# Patient Record
Sex: FEMALE | Race: WHITE | NOT HISPANIC OR LATINO | Employment: PART TIME | URBAN - METROPOLITAN AREA
[De-identification: names, ages, dates, MRNs, and addresses within clinical notes are randomized per-mention and may not be internally consistent; named-entity substitution may affect disease eponyms.]

---

## 2023-08-08 ENCOUNTER — OFFICE VISIT (OUTPATIENT)
Dept: PRIMARY CARE | Facility: CLINIC | Age: 33
End: 2023-08-08
Payer: COMMERCIAL

## 2023-08-08 VITALS
DIASTOLIC BLOOD PRESSURE: 65 MMHG | WEIGHT: 145.4 LBS | BODY MASS INDEX: 23.47 KG/M2 | SYSTOLIC BLOOD PRESSURE: 108 MMHG | HEART RATE: 72 BPM | RESPIRATION RATE: 14 BRPM

## 2023-08-08 DIAGNOSIS — F90.9 ATTENTION DEFICIT HYPERACTIVITY DISORDER (ADHD), UNSPECIFIED ADHD TYPE: Primary | ICD-10-CM

## 2023-08-08 DIAGNOSIS — Z00.00 ROUTINE GENERAL MEDICAL EXAMINATION AT A HEALTH CARE FACILITY: ICD-10-CM

## 2023-08-08 PROCEDURE — 80365 DRUG SCREENING OXYCODONE: CPT

## 2023-08-08 PROCEDURE — 80361 OPIATES 1 OR MORE: CPT

## 2023-08-08 PROCEDURE — 80368 SEDATIVE HYPNOTICS: CPT

## 2023-08-08 PROCEDURE — 80346 BENZODIAZEPINES1-12: CPT

## 2023-08-08 PROCEDURE — 80358 DRUG SCREENING METHADONE: CPT

## 2023-08-08 PROCEDURE — 80373 DRUG SCREENING TRAMADOL: CPT

## 2023-08-08 PROCEDURE — 99395 PREV VISIT EST AGE 18-39: CPT | Performed by: INTERNAL MEDICINE

## 2023-08-08 PROCEDURE — 80307 DRUG TEST PRSMV CHEM ANLYZR: CPT

## 2023-08-08 PROCEDURE — 80354 DRUG SCREENING FENTANYL: CPT

## 2023-08-08 RX ORDER — FOLIC ACID/MULTIVIT,IRON,MINER 0.4MG-18MG
1 TABLET ORAL DAILY
COMMUNITY
Start: 2022-08-04

## 2023-08-08 RX ORDER — DEXTROAMPHETAMINE SACCHARATE, AMPHETAMINE ASPARTATE, DEXTROAMPHETAMINE SULFATE AND AMPHETAMINE SULFATE 2.5; 2.5; 2.5; 2.5 MG/1; MG/1; MG/1; MG/1
10 TABLET ORAL 2 TIMES DAILY
Qty: 60 TABLET | Refills: 0 | Status: SHIPPED | OUTPATIENT
Start: 2023-08-08 | End: 2023-11-08 | Stop reason: SDUPTHER

## 2023-08-08 NOTE — PROGRESS NOTES
"Subjective   33yo F with PMH ADHD presenting for annual physical exam.     Pt recently gave birth 3/2023 at 39 weeks complicated by perineal laceration. Delivered at [facility], had a baby boy. Initially felt some \"baby blues\" but has since felt much better. She has had some constipation since pregnancy she has been managing with OTC miralax or colace. She is trying to increase fiber/fluids in diet to help with symptoms. She is weaning off breastfeeding right now and has had some dizziness during the process. Denies any falls. Otherwise, feels well and denies any other health concerns.    Denies any chest pain, shortness of breath, dysuria, hematuria, leg swelling, numbness/tingling, or changes in vision.     Exercise: walking, sometimes peloton    Tobacco: never  Alcohol: 3 drinks a week (wine)   Drugs: no illicit drugs  Live in [place] with  and baby. Not working right now, thinking of going back part time in September- 2 times a week.     12pt ROS was completed and is negative other than above HPI.     Current Outpatient Medications   Medication Instructions    PNV cmb#95-ferrous fumarate-FA (Prenatal) 28 mg iron- 800 mcg tablet 1 tablet, oral, Daily        Objective   Visit Vitals  /65   Pulse 72   Resp 14   Wt 66 kg (145 lb 6.4 oz)   BMI 23.47 kg/m²   BSA 1.75 m²        Physical Exam   General: in no acute distress, appears stated age  HEENT: PERRL, no slceral icterus, MMM  Lungs: CTAB, no increased WOB, no wheezes noted  Heart: RRR, no murmurs noted  Abdomen: soft, nontender, nondistended  Skin: no suspect lesions/rashes noted  Ext: no cyanosis or edema, 2+ PT/DP pulses   Neuro: no FND, intact sensation bilaterally, moves all 4 ext spontaneously   Psych: appropriate mood/affect     Assessment/Plan   33yo F with PMH ADHD presenting for annual physical exam.     Constipation postpartum  -encouraged fluids/fiber in diet, exercise to help symptoms   -encouraged continued miralax prn for constipation "     Anal sphincter incontinence, altered sexual function postpartum   -follows with urogyn, obgyn  -s/p PT (pelvic floor training) for perineal laceration and anal sphincter incontinence    ADHD  -has not been taking adderall since pregnancy, still not taking  -would like prn script when she goes back to work in September   -continue to monitor symptoms    HM  -PAP with HPV testing 3/2022 normal   -Tdap 2023- had one during pregnancy   -labs 3/2023    Xiao Ambrocio MD   PGY2, Internal Medicine Resident

## 2023-08-08 NOTE — PROGRESS NOTES
I reviewed with the resident the medical history and the resident’s findings on physical examination.  I discussed with the resident the patient’s diagnosis and concur with the treatment plan as documented in the resident note.         Annika Marsh is a 72 yo F presenting for sick visit.   CPE/MCR due 11.21.23.     1. Knee OA s/p L TKR, R knee OA with injection therapy     1a. L hip OA pending visit with ortho to discuss hip replacement      2. Dysuria      3. Hypothyroidism   -ltx 88      4. OAB   -failed myrbetriq   -failed solifenacin   -failed oxybytnin   -discussed third line tr ten PTNS, Botox, SNM at last visit 6/2021   -rec'ed UDS   [ ]not in re-refrral at this time   [ ]desires another trial of a med - trospium will be trialed but not optimistic   [ ]consider re-referral urogyen      5. DLD   -livalo   -atorva 20      6. GERD   -omeprazole      7. Tobacco abuse  -declines CT lung screening     #HM   -shingrix x2   -flu utd   -tdap 2021   -pneumovax utd   -cscope 4/2019 - TA   -screen labs 11.22  -mammo 6/2021 - due   -dexa 3/2022 - osteopenia   -declines ct lung screening, continue to re-address        Rosa Voss MD

## 2023-08-10 LAB
6-ACETYLMORPHINE: <25 NG/ML
7-AMINOCLONAZEPAM: <25 NG/ML
ALPHA-HYDROXYALPRAZOLAM: <25 NG/ML
ALPHA-HYDROXYMIDAZOLAM: <25 NG/ML
ALPRAZOLAM: <25 NG/ML
AMPHETAMINE (PRESENCE) IN URINE BY SCREEN METHOD: ABNORMAL
BARBITURATES PRESENCE IN URINE BY SCREEN METHOD: ABNORMAL
CANNABINOIDS IN URINE BY SCREEN METHOD: ABNORMAL
CHLORDIAZEPOXIDE: <25 NG/ML
CLONAZEPAM: <25 NG/ML
COCAINE (PRESENCE) IN URINE BY SCREEN METHOD: ABNORMAL
CODEINE: <50 NG/ML
CREATINE, URINE FOR DRUG: 12.3 MG/DL
DIAZEPAM: <25 NG/ML
DRUG SCREEN COMMENT URINE: ABNORMAL
EDDP: <25 NG/ML
FENTANYL CONFIRMATION, URINE: <2.5 NG/ML
HYDROCODONE: <25 NG/ML
HYDROMORPHONE: <25 NG/ML
LORAZEPAM: <25 NG/ML
METHADONE CONFIRMATION,URINE: <25 NG/ML
MIDAZOLAM: <25 NG/ML
MORPHINE URINE: <50 NG/ML
NORDIAZEPAM: <25 NG/ML
NORFENTANYL: <2.5 NG/ML
NORHYDROCODONE: <25 NG/ML
NOROXYCODONE: <25 NG/ML
O-DESMETHYLTRAMADOL: <50 NG/ML
OXAZEPAM: <25 NG/ML
OXYCODONE: <25 NG/ML
OXYMORPHONE: <25 NG/ML
PHENCYCLIDINE (PRESENCE) IN URINE BY SCREEN METHOD: ABNORMAL
SPECIFIC GRAVITY, URINE DRUG: 1.01
TEMAZEPAM: <25 NG/ML
TRAMADOL: <50 NG/ML
ZOLPIDEM METABOLITE (ZCA): <25 NG/ML
ZOLPIDEM: <25 NG/ML

## 2023-11-08 DIAGNOSIS — F90.9 ATTENTION DEFICIT HYPERACTIVITY DISORDER (ADHD), UNSPECIFIED ADHD TYPE: ICD-10-CM

## 2023-11-09 RX ORDER — DEXTROAMPHETAMINE SACCHARATE, AMPHETAMINE ASPARTATE, DEXTROAMPHETAMINE SULFATE AND AMPHETAMINE SULFATE 2.5; 2.5; 2.5; 2.5 MG/1; MG/1; MG/1; MG/1
10 TABLET ORAL 2 TIMES DAILY
Qty: 60 TABLET | Refills: 0 | Status: SHIPPED | OUTPATIENT
Start: 2023-11-09 | End: 2023-12-27 | Stop reason: SDUPTHER

## 2023-11-19 DIAGNOSIS — J32.9 BACTERIAL SINUSITIS: Primary | ICD-10-CM

## 2023-11-19 DIAGNOSIS — B96.89 BACTERIAL SINUSITIS: Primary | ICD-10-CM

## 2023-11-19 RX ORDER — AMOXICILLIN AND CLAVULANATE POTASSIUM 875; 125 MG/1; MG/1
875 TABLET, FILM COATED ORAL 2 TIMES DAILY
Qty: 14 TABLET | Refills: 0 | Status: SHIPPED | OUTPATIENT
Start: 2023-11-19 | End: 2023-11-26

## 2023-11-19 NOTE — PROGRESS NOTES
Viral URI with concern for progression to acute bacterial sinusitis   Discussed with patient over the phone  Planning to monitor symptoms; if persistent, consider starting antibiotics later this week  Provided presently given upcoming travel out of town    Juarez Altamirano MD

## 2023-12-27 DIAGNOSIS — F90.9 ATTENTION DEFICIT HYPERACTIVITY DISORDER (ADHD), UNSPECIFIED ADHD TYPE: ICD-10-CM

## 2023-12-27 NOTE — TELEPHONE ENCOUNTER
Patient say she is in [place] and her adderall will run out, she is requesting a refill sent to [facility] in [facility] listed in chart

## 2023-12-28 RX ORDER — DEXTROAMPHETAMINE SACCHARATE, AMPHETAMINE ASPARTATE, DEXTROAMPHETAMINE SULFATE AND AMPHETAMINE SULFATE 2.5; 2.5; 2.5; 2.5 MG/1; MG/1; MG/1; MG/1
10 TABLET ORAL 2 TIMES DAILY
Qty: 60 TABLET | Refills: 0 | Status: SHIPPED | OUTPATIENT
Start: 2023-12-28 | End: 2024-02-28 | Stop reason: SDUPTHER

## 2024-02-27 ENCOUNTER — TELEPHONE (OUTPATIENT)
Dept: PRIMARY CARE | Facility: CLINIC | Age: 34
End: 2024-02-27
Payer: COMMERCIAL

## 2024-02-27 DIAGNOSIS — F90.9 ATTENTION DEFICIT HYPERACTIVITY DISORDER (ADHD), UNSPECIFIED ADHD TYPE: ICD-10-CM

## 2024-02-28 RX ORDER — DEXTROAMPHETAMINE SACCHARATE, AMPHETAMINE ASPARTATE, DEXTROAMPHETAMINE SULFATE AND AMPHETAMINE SULFATE 2.5; 2.5; 2.5; 2.5 MG/1; MG/1; MG/1; MG/1
10 TABLET ORAL 2 TIMES DAILY
Qty: 40 TABLET | Refills: 0 | Status: SHIPPED | OUTPATIENT
Start: 2024-02-28 | End: 2024-03-26 | Stop reason: SDUPTHER

## 2024-03-25 ENCOUNTER — APPOINTMENT (OUTPATIENT)
Dept: PRIMARY CARE | Facility: CLINIC | Age: 34
End: 2024-03-25
Payer: COMMERCIAL

## 2024-03-26 ENCOUNTER — APPOINTMENT (OUTPATIENT)
Dept: PRIMARY CARE | Facility: CLINIC | Age: 34
End: 2024-03-26
Payer: COMMERCIAL

## 2024-03-26 ENCOUNTER — OFFICE VISIT (OUTPATIENT)
Dept: PRIMARY CARE | Facility: CLINIC | Age: 34
End: 2024-03-26
Payer: COMMERCIAL

## 2024-03-26 VITALS — DIASTOLIC BLOOD PRESSURE: 72 MMHG | SYSTOLIC BLOOD PRESSURE: 108 MMHG

## 2024-03-26 DIAGNOSIS — F90.9 ATTENTION DEFICIT HYPERACTIVITY DISORDER (ADHD), UNSPECIFIED ADHD TYPE: ICD-10-CM

## 2024-03-26 PROCEDURE — 99213 OFFICE O/P EST LOW 20 MIN: CPT | Performed by: INTERNAL MEDICINE

## 2024-03-26 RX ORDER — DEXTROAMPHETAMINE SACCHARATE, AMPHETAMINE ASPARTATE, DEXTROAMPHETAMINE SULFATE AND AMPHETAMINE SULFATE 2.5; 2.5; 2.5; 2.5 MG/1; MG/1; MG/1; MG/1
TABLET ORAL
Qty: 60 TABLET | Refills: 0 | Status: SHIPPED | OUTPATIENT
Start: 2024-03-26 | End: 2024-05-13 | Stop reason: SDUPTHER

## 2024-03-26 NOTE — PROGRESS NOTES
Subjective   Patient ID: Jacob Bronson is a 33 y.o. female who presents for follow up  HPI    33 year old female with a hx of ADHD presenting for 6 month follow up.  No complaints this visit. Reports post-partum constipation and incontinence from perineal laceration has completely resolved.  Takes Adderall as needed, pretty much every day as per patient. No side effects at this time, reports that it is working well, no other concerns at this time. All other ROS negative.      Shx: Social alcohol use, no tobacco use, no other drug use. Lives at home with  and baby. 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      Objective   Physical Exam  Constitutional:       Appearance: Normal appearance.   HENT:      Head: Normocephalic and atraumatic.   Eyes:      Extraocular Movements: Extraocular movements intact.      Pupils: Pupils are equal, round, and reactive to light.   Cardiovascular:      Rate and Rhythm: Normal rate and regular rhythm.      Heart sounds: No murmur heard.  Pulmonary:      Effort: Pulmonary effort is normal.      Breath sounds: Normal breath sounds.   Abdominal:      General: Abdomen is flat. There is no distension.      Palpations: Abdomen is soft.   Musculoskeletal:         General: Normal range of motion.      Cervical back: Normal range of motion.   Skin:     General: Skin is warm.      Capillary Refill: Capillary refill takes less than 2 seconds.   Neurological:      General: No focal deficit present.      Mental Status: She is alert.         Assessment/Plan   31yo F with PMH ADHD presenting for annual physical exam.      Constipation postpartum  -Resolved     Anal sphincter incontinence, altered sexual function postpartum   -follows with urogyn, obgyn  -s/p PT (pelvic floor training) for perineal laceration and anal sphincter incontinence  -Resolved     ADHD:  -UDS in 6 months and Controlled substance form in 6 months  -Refill  today    HM  -PAP with HPV testing 3/2022 normal   -Tdap 2023- had one during pregnancy   -Talk to pt about labs if interested

## 2024-04-20 ENCOUNTER — HOSPITAL ENCOUNTER (EMERGENCY)
Facility: HOSPITAL | Age: 34
Discharge: HOME | End: 2024-04-20
Attending: EMERGENCY MEDICINE
Payer: COMMERCIAL

## 2024-04-20 ENCOUNTER — TELEPHONE (OUTPATIENT)
Dept: PRIMARY CARE | Facility: CLINIC | Age: 34
End: 2024-04-20

## 2024-04-20 VITALS
OXYGEN SATURATION: 100 % | SYSTOLIC BLOOD PRESSURE: 123 MMHG | WEIGHT: 131 LBS | BODY MASS INDEX: 21.05 KG/M2 | HEIGHT: 66 IN | DIASTOLIC BLOOD PRESSURE: 73 MMHG | TEMPERATURE: 97.6 F | RESPIRATION RATE: 16 BRPM | HEART RATE: 72 BPM

## 2024-04-20 DIAGNOSIS — R10.13 EPIGASTRIC PAIN: Primary | ICD-10-CM

## 2024-04-20 LAB
ALBUMIN SERPL BCP-MCNC: 4.4 G/DL (ref 3.4–5)
ALP SERPL-CCNC: 35 U/L (ref 33–110)
ALT SERPL W P-5'-P-CCNC: 10 U/L (ref 7–45)
ANION GAP SERPL CALC-SCNC: 12 MMOL/L (ref 10–20)
AST SERPL W P-5'-P-CCNC: 13 U/L (ref 9–39)
B-HCG SERPL-ACNC: <2 MIU/ML
BASOPHILS # BLD AUTO: 0.01 X10*3/UL (ref 0–0.1)
BASOPHILS NFR BLD AUTO: 0.1 %
BILIRUB SERPL-MCNC: 0.5 MG/DL (ref 0–1.2)
BUN SERPL-MCNC: 9 MG/DL (ref 6–23)
CALCIUM SERPL-MCNC: 8.9 MG/DL (ref 8.6–10.3)
CHLORIDE SERPL-SCNC: 104 MMOL/L (ref 98–107)
CO2 SERPL-SCNC: 28 MMOL/L (ref 21–32)
CREAT SERPL-MCNC: 0.52 MG/DL (ref 0.5–1.05)
EGFRCR SERPLBLD CKD-EPI 2021: >90 ML/MIN/1.73M*2
EOSINOPHIL # BLD AUTO: 0.12 X10*3/UL (ref 0–0.7)
EOSINOPHIL NFR BLD AUTO: 1.5 %
ERYTHROCYTE [DISTWIDTH] IN BLOOD BY AUTOMATED COUNT: 12.9 % (ref 11.5–14.5)
GLUCOSE SERPL-MCNC: 92 MG/DL (ref 74–99)
HCT VFR BLD AUTO: 40.2 % (ref 36–46)
HGB BLD-MCNC: 13.2 G/DL (ref 12–16)
IMM GRANULOCYTES # BLD AUTO: 0.03 X10*3/UL (ref 0–0.7)
IMM GRANULOCYTES NFR BLD AUTO: 0.4 % (ref 0–0.9)
LIPASE SERPL-CCNC: 33 U/L (ref 9–82)
LYMPHOCYTES # BLD AUTO: 2.42 X10*3/UL (ref 1.2–4.8)
LYMPHOCYTES NFR BLD AUTO: 30 %
MCH RBC QN AUTO: 31.6 PG (ref 26–34)
MCHC RBC AUTO-ENTMCNC: 32.8 G/DL (ref 32–36)
MCV RBC AUTO: 96 FL (ref 80–100)
MONOCYTES # BLD AUTO: 0.44 X10*3/UL (ref 0.1–1)
MONOCYTES NFR BLD AUTO: 5.4 %
NEUTROPHILS # BLD AUTO: 5.06 X10*3/UL (ref 1.2–7.7)
NEUTROPHILS NFR BLD AUTO: 62.6 %
NRBC BLD-RTO: 0 /100 WBCS (ref 0–0)
PLATELET # BLD AUTO: 272 X10*3/UL (ref 150–450)
POTASSIUM SERPL-SCNC: 3.9 MMOL/L (ref 3.5–5.3)
PROT SERPL-MCNC: 6.8 G/DL (ref 6.4–8.2)
RBC # BLD AUTO: 4.18 X10*6/UL (ref 4–5.2)
SODIUM SERPL-SCNC: 140 MMOL/L (ref 136–145)
WBC # BLD AUTO: 8.1 X10*3/UL (ref 4.4–11.3)

## 2024-04-20 PROCEDURE — 85025 COMPLETE CBC W/AUTO DIFF WBC: CPT

## 2024-04-20 PROCEDURE — 84702 CHORIONIC GONADOTROPIN TEST: CPT

## 2024-04-20 PROCEDURE — 96361 HYDRATE IV INFUSION ADD-ON: CPT | Performed by: EMERGENCY MEDICINE

## 2024-04-20 PROCEDURE — 96374 THER/PROPH/DIAG INJ IV PUSH: CPT | Performed by: EMERGENCY MEDICINE

## 2024-04-20 PROCEDURE — 80053 COMPREHEN METABOLIC PANEL: CPT

## 2024-04-20 PROCEDURE — C9113 INJ PANTOPRAZOLE SODIUM, VIA: HCPCS

## 2024-04-20 PROCEDURE — 99284 EMERGENCY DEPT VISIT MOD MDM: CPT | Mod: 25 | Performed by: EMERGENCY MEDICINE

## 2024-04-20 PROCEDURE — 2500000004 HC RX 250 GENERAL PHARMACY W/ HCPCS (ALT 636 FOR OP/ED)

## 2024-04-20 PROCEDURE — 2500000001 HC RX 250 WO HCPCS SELF ADMINISTERED DRUGS (ALT 637 FOR MEDICARE OP)

## 2024-04-20 PROCEDURE — 83690 ASSAY OF LIPASE: CPT

## 2024-04-20 PROCEDURE — 36415 COLL VENOUS BLD VENIPUNCTURE: CPT

## 2024-04-20 RX ORDER — ALUMINUM HYDROXIDE, MAGNESIUM HYDROXIDE, AND SIMETHICONE 1200; 120; 1200 MG/30ML; MG/30ML; MG/30ML
30 SUSPENSION ORAL ONCE
Status: COMPLETED | OUTPATIENT
Start: 2024-04-20 | End: 2024-04-20

## 2024-04-20 RX ORDER — BISMUTH SUBSALICYLATE 262 MG/1
524 TABLET ORAL ONCE
Status: DISCONTINUED | OUTPATIENT
Start: 2024-04-20 | End: 2024-04-20

## 2024-04-20 RX ORDER — PANTOPRAZOLE SODIUM 20 MG/1
20 TABLET, DELAYED RELEASE ORAL DAILY
Qty: 20 TABLET | Refills: 0 | Status: SHIPPED | OUTPATIENT
Start: 2024-04-20 | End: 2024-05-10

## 2024-04-20 RX ORDER — LIDOCAINE HYDROCHLORIDE 20 MG/ML
15 SOLUTION OROPHARYNGEAL ONCE
Status: COMPLETED | OUTPATIENT
Start: 2024-04-20 | End: 2024-04-20

## 2024-04-20 RX ORDER — PANTOPRAZOLE SODIUM 40 MG/10ML
40 INJECTION, POWDER, LYOPHILIZED, FOR SOLUTION INTRAVENOUS ONCE
Status: COMPLETED | OUTPATIENT
Start: 2024-04-20 | End: 2024-04-20

## 2024-04-20 RX ORDER — ACETAMINOPHEN 325 MG/1
650 TABLET ORAL ONCE
Status: DISCONTINUED | OUTPATIENT
Start: 2024-04-20 | End: 2024-04-20

## 2024-04-20 RX ADMIN — SODIUM CHLORIDE, POTASSIUM CHLORIDE, SODIUM LACTATE AND CALCIUM CHLORIDE 1000 ML: 600; 310; 30; 20 INJECTION, SOLUTION INTRAVENOUS at 11:32

## 2024-04-20 RX ADMIN — LIDOCAINE HYDROCHLORIDE 15 ML: 20 SOLUTION ORAL at 11:30

## 2024-04-20 RX ADMIN — PANTOPRAZOLE SODIUM 40 MG: 40 INJECTION, POWDER, FOR SOLUTION INTRAVENOUS at 11:30

## 2024-04-20 RX ADMIN — ALUMINUM HYDROXIDE, MAGNESIUM HYDROXIDE, AND DIMETHICONE 30 ML: 200; 20; 200 SUSPENSION ORAL at 11:29

## 2024-04-20 ASSESSMENT — PAIN - FUNCTIONAL ASSESSMENT: PAIN_FUNCTIONAL_ASSESSMENT: 0-10

## 2024-04-20 ASSESSMENT — PAIN DESCRIPTION - FREQUENCY: FREQUENCY: CONSTANT/CONTINUOUS

## 2024-04-20 ASSESSMENT — PAIN SCALES - GENERAL
PAINLEVEL_OUTOF10: 0 - NO PAIN
PAINLEVEL_OUTOF10: 6
PAINLEVEL_OUTOF10: 0 - NO PAIN

## 2024-04-20 ASSESSMENT — COLUMBIA-SUICIDE SEVERITY RATING SCALE - C-SSRS
6. HAVE YOU EVER DONE ANYTHING, STARTED TO DO ANYTHING, OR PREPARED TO DO ANYTHING TO END YOUR LIFE?: NO
1. IN THE PAST MONTH, HAVE YOU WISHED YOU WERE DEAD OR WISHED YOU COULD GO TO SLEEP AND NOT WAKE UP?: NO
2. HAVE YOU ACTUALLY HAD ANY THOUGHTS OF KILLING YOURSELF?: NO

## 2024-04-20 ASSESSMENT — PAIN DESCRIPTION - PAIN TYPE: TYPE: ACUTE PAIN

## 2024-04-20 ASSESSMENT — PAIN DESCRIPTION - ONSET: ONSET: ONGOING

## 2024-04-20 ASSESSMENT — PAIN DESCRIPTION - LOCATION: LOCATION: ABDOMEN

## 2024-04-20 ASSESSMENT — PAIN DESCRIPTION - ORIENTATION: ORIENTATION: UPPER;MID

## 2024-04-20 NOTE — DISCHARGE INSTRUCTIONS
Please follow-up with the primary care provider.  He will be given Protonix to go home with it was medication received early in the morning.  Please return to the emergency room if you are having any new or worsening symptoms.

## 2024-04-20 NOTE — ED TRIAGE NOTES
Pt. States she is having intense abdominal pain in the center of her abdomin. Pt endorses nausea at time. States the pain started at 7:30 am yesterday. Pt. Denies bowel and bladder issues.

## 2024-04-20 NOTE — ED PROVIDER NOTES
HPI   No chief complaint on file.      HPI    Patient is a 33-year-old female with no significant past medical history presents emergency room for stomach pain.  Patient states that the pain began yesterday 7:30 AM, located epigastric region, does not radiate.  Patient denies nausea, vomiting, fever, cough, congestion, diarrhea, constipation.  Patient states she was eating her normal diet.  Patient states that she has been having some mild cold.  Patient states that eating does make it worse.  Patient states that her last possible period was about a week ago, is regular, last 6 days, no increased pain during menstruation.                     No data recorded                   Patient History   Past Medical History:   Diagnosis Date    Encounter for supervision of normal first pregnancy, unspecified trimester ([facility])     Primigravida, antepartum     Past Surgical History:   Procedure Laterality Date    OTHER SURGICAL HISTORY  08/04/2022    [place] tooth extraction     No family history on file.  Social History     Tobacco Use    Smoking status: Not on file    Smokeless tobacco: Not on file   Substance Use Topics    Alcohol use: Not on file    Drug use: Not on file       Physical Exam   ED Triage Vitals   Temperature Heart Rate Respirations BP   04/20/24 1033 04/20/24 1033 04/20/24 1033 04/20/24 1036   36.4 °C (97.6 °F) 69 16 123/73      Pulse Ox Temp Source Heart Rate Source Patient Position   04/20/24 1033 04/20/24 1033 -- --   100 % Temporal        BP Location FiO2 (%)     -- --             Physical Exam  Constitutional:       Appearance: Normal appearance. She is normal weight.   HENT:      Head: Normocephalic and atraumatic.      Right Ear: External ear normal.      Left Ear: External ear normal.      Nose: Nose normal.      Mouth/Throat:      Mouth: Mucous membranes are moist.      Pharynx: Oropharynx is clear.   Eyes:      Conjunctiva/sclera: Conjunctivae normal.      Pupils: Pupils are equal, round, and  reactive to light.   Cardiovascular:      Rate and Rhythm: Normal rate and regular rhythm.   Pulmonary:      Effort: Pulmonary effort is normal.      Breath sounds: Normal breath sounds.   Abdominal:      General: Abdomen is flat. Bowel sounds are normal. There is no distension.      Palpations: Abdomen is soft. There is no mass.      Tenderness: There is abdominal tenderness. There is no guarding or rebound.      Comments: Minimal tenderness to palpation in the epigastric area   Musculoskeletal:         General: Normal range of motion.      Cervical back: Normal range of motion and neck supple.   Skin:     General: Skin is warm.      Capillary Refill: Capillary refill takes less than 2 seconds.   Neurological:      General: No focal deficit present.      Mental Status: She is alert and oriented to person, place, and time. Mental status is at baseline.   Psychiatric:         Mood and Affect: Mood normal.         Behavior: Behavior normal.         Thought Content: Thought content normal.         Judgment: Judgment normal.         ED Course & MDM   Diagnoses as of 04/21/24 1557   Epigastric pain       Medical Decision Making  Patient is a 33-year-old female with no significant past medical history presents emergency room for stomach pain.  Patient states that the pain began yesterday 7:30 AM, located epigastric region, does not radiate.  Patient denies nausea, vomiting, fever, cough, congestion, diarrhea, constipation.  Patient states that she had a total of 800 mg of Advil the day prior to her abdominal pain, but does not adequately take a lot of ibuprofen.  Patient denies hematochezia, hematuria.  Patient states that her last menstrual period was about a week ago, lasted 5 to 6 days, not painful out of the usual.  Patient states that she been having mild cold as her young child also has a cold.    Differential diagnosis includes gastritis, GERD, pancreatitis, cholecystitis, appendicitis, ectopic pregnancy, peptic  ulcer disease.    CMP, CBC, lipase within normal limits.  hCG is less than 2.  Patient was given 1 L of LR along with 4 mg of Protonix IV along with Mylanta and lidocaine 2% solution.  Patient states that is much relief after the medications.  A bedside POCUS of her gallbladder was completed and showed no pericholecystic fluid, no visible gallstone, no distention noted over the gallbladder wall but some mild sludge seen.  Given the patient's normal vitals along with normal lab work and minimal epigastric tenderness with relief from medications, this is likely gastritis versus GERD.  At this point, given the benign abdominal exam along with the benign lab work, there is less indication that CT scans are needed.  Patient was given a prescription of Protonix.  Throughout her stay patient has been vitally stable.  Patient was told to follow-up with their primary care provider for any additional follow-up required.  Patient informed to return to the emergency room if she has any new or worsening symptoms.      Procedure  Procedures     Isatu Mcelroy MD  Resident  04/21/24 5464

## 2024-05-10 DIAGNOSIS — F90.9 ATTENTION DEFICIT HYPERACTIVITY DISORDER (ADHD), UNSPECIFIED ADHD TYPE: ICD-10-CM

## 2024-05-13 DIAGNOSIS — F90.9 ATTENTION DEFICIT HYPERACTIVITY DISORDER (ADHD), UNSPECIFIED ADHD TYPE: ICD-10-CM

## 2024-05-13 RX ORDER — DEXTROAMPHETAMINE SACCHARATE, AMPHETAMINE ASPARTATE, DEXTROAMPHETAMINE SULFATE AND AMPHETAMINE SULFATE 2.5; 2.5; 2.5; 2.5 MG/1; MG/1; MG/1; MG/1
10 TABLET ORAL 2 TIMES DAILY
Qty: 60 TABLET | Refills: 0 | OUTPATIENT
Start: 2024-05-13

## 2024-05-14 RX ORDER — DEXTROAMPHETAMINE SACCHARATE, AMPHETAMINE ASPARTATE, DEXTROAMPHETAMINE SULFATE AND AMPHETAMINE SULFATE 2.5; 2.5; 2.5; 2.5 MG/1; MG/1; MG/1; MG/1
10 TABLET ORAL 2 TIMES DAILY
Qty: 60 TABLET | Refills: 0 | Status: SHIPPED | OUTPATIENT
Start: 2024-05-14

## 2025-09-15 ENCOUNTER — APPOINTMENT (OUTPATIENT)
Dept: PRIMARY CARE | Facility: CLINIC | Age: 35
End: 2025-09-15
Payer: COMMERCIAL